# Patient Record
Sex: MALE | Race: WHITE | Employment: FULL TIME | URBAN - METROPOLITAN AREA
[De-identification: names, ages, dates, MRNs, and addresses within clinical notes are randomized per-mention and may not be internally consistent; named-entity substitution may affect disease eponyms.]

---

## 2019-04-08 ENCOUNTER — HOSPITAL ENCOUNTER (OUTPATIENT)
Age: 44
Discharge: HOME | End: 2019-04-08
Payer: COMMERCIAL

## 2019-04-08 LAB
A/G RATIO: 1.1 (ref 1.1–2.2)
ALBUMIN SERPL-MCNC: 4.2 G/DL (ref 3.4–5)
ALP BLD-CCNC: 78 U/L (ref 40–129)
ALT SERPL-CCNC: 17 U/L (ref 10–40)
ANION GAP SERPL CALCULATED.3IONS-SCNC: 14 MMOL/L (ref 3–16)
AST SERPL-CCNC: 16 U/L (ref 15–37)
BASOPHILS ABSOLUTE: 0 K/UL (ref 0–0.2)
BASOPHILS RELATIVE PERCENT: 0.9 %
BILIRUB SERPL-MCNC: <0.2 MG/DL (ref 0–1)
BUN BLDV-MCNC: 12 MG/DL (ref 7–20)
C-REACTIVE PROTEIN, HIGH SENSITIVITY: 1.84 MG/L (ref 0.16–3)
CALCIUM SERPL-MCNC: 9.1 MG/DL (ref 8.3–10.6)
CHLORIDE BLD-SCNC: 103 MMOL/L (ref 99–110)
CHOLESTEROL, TOTAL: 297 MG/DL (ref 0–199)
CO2: 22 MMOL/L (ref 21–32)
CREAT SERPL-MCNC: 0.8 MG/DL (ref 0.9–1.3)
EOSINOPHILS ABSOLUTE: 0.1 K/UL (ref 0–0.6)
EOSINOPHILS RELATIVE PERCENT: 1.8 %
FOLATE: 8.52 NG/ML (ref 4.78–24.2)
GFR AFRICAN AMERICAN: >60
GFR NON-AFRICAN AMERICAN: >60
GLOBULIN: 3.7 G/DL
GLUCOSE BLD-MCNC: 104 MG/DL (ref 70–99)
HCT VFR BLD CALC: 43.3 % (ref 40.5–52.5)
HDLC SERPL-MCNC: 32 MG/DL (ref 40–60)
HEMOGLOBIN: 14.7 G/DL (ref 13.5–17.5)
HOMOCYSTEINE: 11 UMOL/L (ref 0–10)
LDL CHOLESTEROL CALCULATED: 237 MG/DL
LYMPHOCYTES ABSOLUTE: 1.2 K/UL (ref 1–5.1)
LYMPHOCYTES RELATIVE PERCENT: 24.1 %
MCH RBC QN AUTO: 29 PG (ref 26–34)
MCHC RBC AUTO-ENTMCNC: 33.9 G/DL (ref 31–36)
MCV RBC AUTO: 85.7 FL (ref 80–100)
MONOCYTES ABSOLUTE: 0.3 K/UL (ref 0–1.3)
MONOCYTES RELATIVE PERCENT: 5.8 %
NEUTROPHILS ABSOLUTE: 3.3 K/UL (ref 1.7–7.7)
NEUTROPHILS RELATIVE PERCENT: 67.4 %
PDW BLD-RTO: 12.4 % (ref 12.4–15.4)
PLATELET # BLD: 253 K/UL (ref 135–450)
PMV BLD AUTO: 8.1 FL (ref 5–10.5)
POTASSIUM SERPL-SCNC: 4.4 MMOL/L (ref 3.5–5.1)
RBC # BLD: 5.06 M/UL (ref 4.2–5.9)
SEDIMENTATION RATE, ERYTHROCYTE: 17 MM/HR (ref 0–15)
SODIUM BLD-SCNC: 139 MMOL/L (ref 136–145)
T3 FREE: 3.2 PG/ML (ref 2.3–4.2)
T4 FREE: 1.1 NG/DL (ref 0.9–1.8)
TOTAL CK: 86 U/L (ref 39–308)
TOTAL PROTEIN: 7.9 G/DL (ref 6.4–8.2)
TRIGL SERPL-MCNC: 142 MG/DL (ref 0–150)
TSH SERPL DL<=0.05 MIU/L-ACNC: 1.5 UIU/ML (ref 0.27–4.2)
VITAMIN B-12: 562 PG/ML (ref 211–911)
VITAMIN D 25-HYDROXY: 12.3 NG/ML
VLDLC SERPL CALC-MCNC: 28 MG/DL
WBC # BLD: 4.9 K/UL (ref 4–11)

## 2019-04-08 PROCEDURE — 84207 ASSAY OF VITAMIN B-6: CPT

## 2019-04-08 PROCEDURE — 85730 THROMBOPLASTIN TIME PARTIAL: CPT

## 2019-04-08 PROCEDURE — 84443 ASSAY THYROID STIM HORMONE: CPT

## 2019-04-08 PROCEDURE — 82607 VITAMIN B-12: CPT

## 2019-04-08 PROCEDURE — 85652 RBC SED RATE AUTOMATED: CPT

## 2019-04-08 PROCEDURE — 82785 ASSAY OF IGE: CPT

## 2019-04-08 PROCEDURE — 86038 ANTINUCLEAR ANTIBODIES: CPT

## 2019-04-08 PROCEDURE — 86141 C-REACTIVE PROTEIN HS: CPT

## 2019-04-08 PROCEDURE — 80053 COMPREHEN METABOLIC PANEL: CPT

## 2019-04-08 PROCEDURE — 86147 CARDIOLIPIN ANTIBODY EA IG: CPT

## 2019-04-08 PROCEDURE — 84270 ASSAY OF SEX HORMONE GLOBUL: CPT

## 2019-04-08 PROCEDURE — 80061 LIPID PANEL: CPT

## 2019-04-08 PROCEDURE — 85303 CLOT INHIBIT PROT C ACTIVITY: CPT

## 2019-04-08 PROCEDURE — 82085 ASSAY OF ALDOLASE: CPT

## 2019-04-08 PROCEDURE — 84439 ASSAY OF FREE THYROXINE: CPT

## 2019-04-08 PROCEDURE — 82550 ASSAY OF CK (CPK): CPT

## 2019-04-08 PROCEDURE — 85306 CLOT INHIBIT PROT S FREE: CPT

## 2019-04-08 PROCEDURE — 82542 COL CHROMOTOGRAPHY QUAL/QUAN: CPT

## 2019-04-08 PROCEDURE — 36415 COLL VENOUS BLD VENIPUNCTURE: CPT

## 2019-04-08 PROCEDURE — 83090 ASSAY OF HOMOCYSTEINE: CPT

## 2019-04-08 PROCEDURE — 82746 ASSAY OF FOLIC ACID SERUM: CPT

## 2019-04-08 PROCEDURE — 84403 ASSAY OF TOTAL TESTOSTERONE: CPT

## 2019-04-08 PROCEDURE — 81240 F2 GENE: CPT

## 2019-04-08 PROCEDURE — 84481 FREE ASSAY (FT-3): CPT

## 2019-04-08 PROCEDURE — 85610 PROTHROMBIN TIME: CPT

## 2019-04-08 PROCEDURE — 85025 COMPLETE CBC W/AUTO DIFF WBC: CPT

## 2019-04-08 PROCEDURE — 82306 VITAMIN D 25 HYDROXY: CPT

## 2019-04-08 PROCEDURE — 85613 RUSSELL VIPER VENOM DILUTED: CPT

## 2019-04-09 LAB — ANTI-NUCLEAR ANTIBODY (ANA): NEGATIVE

## 2019-04-11 LAB
ALDOLASE: 4 U/L (ref 1.5–8.1)
DRVVT CONFIRMATION TEST: NORMAL RATIO
DRVVT SCREEN: 40 SEC (ref 33–44)
DRVVT,DIL: NORMAL SEC (ref 33–44)
HEXAGONAL PHOSPHOLIPID NEUTRALIZAT TEST: NORMAL
LUPUS ANTICOAG INTERP: NORMAL
PLT NEUTA: NORMAL
PROTEIN C FUNCTIONAL: 149 % (ref 83–168)
PROTEIN S, FUNCTIONAL: 115 % (ref 66–143)
PT D: 12.8 SEC (ref 12–15.5)
PTT D: 46 SEC (ref 32–48)
PTT-D CORR REFLEX: NORMAL SEC (ref 32–48)
PTT-HEPARIN NEUTRALIZED: NORMAL SEC (ref 32–48)
REPTILASE TIME: NORMAL SEC
THROMBIN TIME: NORMAL SEC (ref 14.7–19.5)

## 2019-04-12 LAB
ANTICARDIOLIPIN IGA ANTIBODY: 2 APL (ref 0–11)
ANTICARDIOLIPIN IGG ANTIBODY: 2 GPL (ref 0–14)
CARDIOLIPIN AB IGM: 0 MPL (ref 0–12)
IGE: 62 KU/L
PROTHROMBIN G20210A MUTATION: NEGATIVE
PT PCR SPECIMEN: NORMAL
SEX HORMONE BINDING GLOBULIN: 54 NMOL/L (ref 11–80)
TESTOSTERONE FREE-NONMALE: 87.2 PG/ML (ref 47–244)
TESTOSTERONE TOTAL: 566 NG/DL (ref 220–1000)

## 2019-04-13 LAB — VITAMIN B6: 42.5 NMOL/L (ref 20–125)

## 2019-04-15 LAB — MISCELLANEOUS LAB TEST ORDER: ABNORMAL

## 2020-12-07 ENCOUNTER — OFFICE VISIT (OUTPATIENT)
Dept: ENT CLINIC | Age: 45
End: 2020-12-07
Payer: COMMERCIAL

## 2020-12-07 VITALS
DIASTOLIC BLOOD PRESSURE: 87 MMHG | SYSTOLIC BLOOD PRESSURE: 155 MMHG | TEMPERATURE: 97.1 F | WEIGHT: 300 LBS | HEART RATE: 81 BPM

## 2020-12-07 PROBLEM — K14.8 TONGUE LESION: Status: ACTIVE | Noted: 2020-12-07

## 2020-12-07 PROCEDURE — 99203 OFFICE O/P NEW LOW 30 MIN: CPT | Performed by: OTOLARYNGOLOGY

## 2020-12-07 RX ORDER — DEXTROAMPHETAMINE SACCHARATE, AMPHETAMINE ASPARTATE, DEXTROAMPHETAMINE SULFATE AND AMPHETAMINE SULFATE 2.5; 2.5; 2.5; 2.5 MG/1; MG/1; MG/1; MG/1
TABLET ORAL
COMMUNITY
Start: 2020-11-25

## 2020-12-07 RX ORDER — ICOSAPENT ETHYL 1000 MG/1
CAPSULE ORAL
COMMUNITY
Start: 2020-09-27

## 2020-12-07 RX ORDER — BISOPROLOL FUMARATE AND HYDROCHLOROTHIAZIDE 5; 6.25 MG/1; MG/1
TABLET ORAL
COMMUNITY
Start: 2020-09-19

## 2020-12-07 RX ORDER — MULTIVITAMIN
1 TABLET ORAL DAILY
COMMUNITY

## 2020-12-07 SDOH — HEALTH STABILITY: MENTAL HEALTH: HOW OFTEN DO YOU HAVE A DRINK CONTAINING ALCOHOL?: NEVER

## 2020-12-07 NOTE — PROGRESS NOTES
[facility]       NEW PATIENT VISIT    PCP:  Ebony Hurt III, DO    REFERRED BY:   Ebony Hurt III, DO       CHIEF COMPLAINT:  Chief Complaint   Patient presents with    Mouth Lesions       HISTORY OF PRESENT ILLNESS:       Tu Mojica is a 39 y.o. male here for evaluation and treatment of a problem located in the mouth. Dr. Yadiel Palacios and dentist did not see anything. The quality is a weited feeling in the mouth feels like should be something there but  And dentist did not see anything . Located on the very back of side and underneath tongue not pain feels numb. If I move ytongue to the left feel a little prick of pain but not much. The severity is mild. More of a concern it worries me more than it hurts. The duration is 3 months. One morning just felt it, been there ever since somedays more intense than others. Worse it I play with it. The timing is constant. The context is sudden onset just appeared on morning. Modifying allev or aggravating factors include none. Associated symptoms include no pus no bleeding. REVIEW OF SYSTEMS:    CONSTITUTIONAL:  Denied fever. Denied unexplained weight loss, over 20 pounds in the past six months. EARS, NOSE, THROAT:  Denied otorrhea, otalgia, hearing loss, tinnitus, rhinorrhea, nasal dyspnea, sore throat and hoarseness. PAST MEDICAL HISTORY:    Past Medical History:   Diagnosis Date    Hyperlipidemia     Hypertension          No past surgical history on file. EXAMINATION:    Vitals:    12/07/20 1420   BP: (!) 155/87   Site: Right Upper Arm   Position: Sitting   Cuff Size: Large Adult   Pulse: 81   Temp: 97.1 °F (36.2 °C)   TempSrc: Temporal   Weight: 300 lb (136.1 kg)     VITALS SIGNS were reviewed. GENERAL APPEARANCE: WDWN NAD, Alert and oriented X 3. EYES:  Extraocular motion was intact, bilaterally. Normal primary gaze alignment. alternatives of therapy, expected outcome and potential benefits. I discussed the attendant risks and potential complications, including, but not limited to, excessive intraoperative or postoperative bleeding, hemorrhage, infection, vocal cord injury or paralysis, permanent hoarseness or change in or loss of voice, airway obstruction, need for intubation or tracheotomy, injury to surrounding structures or organs, injury to major blood vessels or nerves, excessive scarring, poor (incomplete or lack of) wound healing, pain, discomfort, numbness of lip/teeth/gums/tongue, need for further surgery or other therapy, and risks of anesthesia, including heart attack, cardiac arrest, stroke, blood clots in lower extremity veins, pulmonary embolism, and death, and other risks listed on the informed consent document, which we discussed together. All Jaun's questions were answered. Jaun then stated and confirmed understanding and acceptance of the preceding, lack of further questions and the desire to proceed with surgery. Then, Jaun read and signed the informed consent document. IMPRESSION / DIAGNOSES / ORDERS:       Connie Youssef was seen today for mouth lesions. Diagnoses and all orders for this visit:    Tongue lesion           RECOMMENDATIONS/PLAN:      Return for post op check. EUA and direct laryngoscopy and biopsy of the area of firmness at the posterior lateral ventral tongue area and any other abnormal tissue versus recheck in 6 weeks.   SDS  30 - 45 minutes, SDS

## 2020-12-08 ENCOUNTER — TELEPHONE (OUTPATIENT)
Dept: ENT CLINIC | Age: 45
End: 2020-12-08

## 2020-12-08 ENCOUNTER — OFFICE VISIT (OUTPATIENT)
Dept: PRIMARY CARE CLINIC | Age: 45
End: 2020-12-08
Payer: COMMERCIAL

## 2020-12-08 PROCEDURE — 99211 OFF/OP EST MAY X REQ PHY/QHP: CPT | Performed by: NURSE PRACTITIONER

## 2020-12-08 NOTE — PATIENT INSTRUCTIONS

## 2020-12-09 ENCOUNTER — TELEPHONE (OUTPATIENT)
Dept: ENT CLINIC | Age: 45
End: 2020-12-09

## 2020-12-09 LAB — SARS-COV-2: NOT DETECTED

## 2020-12-09 RX ORDER — EZETIMIBE 10 MG/1
10 TABLET ORAL DAILY
COMMUNITY

## 2020-12-09 NOTE — TELEPHONE ENCOUNTER
Call from [facility] stating pt was not able to get his Pre-op physical because he was put on the surgery schedule so soon but did have his COVID test done.    CB# PAT office @ [facility] [phone] Dorota Cameron

## 2020-12-09 NOTE — TELEPHONE ENCOUNTER
Would you be willing to do the patient's H&P since he did get the covid test, or do we need to reschedule the surgery

## 2020-12-09 NOTE — PROGRESS NOTES
Name_______________________________________Printed:____________________  Date and time of surgery_12/11/20  0730_____________________Arrival Time:_0600  [facility]_______________   1. The instructions given regarding when and if a patient needs to stop oral intake prior to surgery varies. Follow the specific instructions you were given                  _x__Nothing to eat or to drink after Midnight the night before.                             ____Endoscopy patient follow your DRS instructions-generally you will be doing a part of the prep after Midnight                   ____Carbo loading or ERAS instructions will be given to select patients-if you have been given those instructions -please do the following                           The evening before your surgery after dinner before midnight drink 40 ounces of gatorade. If you are diabetic use sugar free. The morning of surgery drink 40 ounces of water. This needs to be finished 3 hours prior to your surgery start time. 2. Take the following pills with a small sip of water on the morning of surgery___ziac________________________________________________                  Do not take blood pressure medications ending in pril or sartan the roque prior to surgery or the morning of surgery_   3. Aspirin, Ibuprofen, Advil, Naproxen, Vitamin E and other Anti-inflammatory products and supplements should be stopped for 5 -7days before surgery or as directed by your physician. 4. Check with your Doctor regarding stopping Plavix, Coumadin,Eliquis, Lovenox,Effient,Pradaxa,Xarelto, Fragmin or other blood thinners and follow their instructions. 5. Do not smoke, and do not drink any alcoholic beverages 24 hours prior to surgery. This includes NA Beer. Refrain from the usage of any recreational drugs. 6. You may brush your teeth and gargle the morning of surgery. DO NOT SWALLOW WATER   7.  You MUST make arrangements for a responsible adult to stay on site while you are here and take you home after your surgery. You will not be allowed to leave alone or drive yourself home. It is strongly suggested someone stay with you the first 24 hrs. Your surgery will be cancelled if you do not have a ride home. 8. A parent/legal guardian must accompany a child scheduled for surgery and plan to stay at the hospital until the child is discharged. Please do not bring other children with you. 9. Please wear simple, loose fitting clothing to the hospital.  Neha Dudley not bring valuables (money, credit cards, checkbooks, etc.) Do not wear any makeup (including no eye makeup) or nail polish on your fingers or toes. 10. DO NOT wear any jewelry or piercings on day of surgery. All body piercing jewelry must be removed. 11. If you have ___dentures, they will be removed before going to the OR; we will provide you a container. If you wear ___contact lenses or ___glasses, they will be removed; please bring a case for them. 12. Please see your family doctor/pediatrician for a history & physical and/or concerning medications. Bring any test results/reports from your physician's office. PCP__________________Phone___________H&P Appt. Date________             13 If you  have a Living Will and Durable Power of  for Healthcare, please bring in a copy. 15. Notify your Surgeon if you develop any illness between now and surgery  time, cough, cold, fever, sore throat, nausea, vomiting, etc.  Please notify your surgeon if you experience dizziness, shortness of breath or blurred vision between now & the time of your surgery             15. DO NOT shave your operative site 96 hours prior to surgery. For face & neck surgery, men may use an electric razor 48 hours prior to surgery. 16. Shower the night before or morning of surgery using an antibacterial soap or as you have been instructed.              17. To provide excellent care visitors will be limited to one in the room at any given time. 18.  Please bring picture ID and insurance card. 19.  Visit our web site for additional information:  [email or web address]              20.During flu season no children under the age of 15 are permitted in the hospital for the safety of all patients. 21. If you take a long acting insulin in the evening only  take half of your usual  dose the night  before your procedure              22. If you use a c-pap please bring DOS if staying overnight,             23.For your convenience [facility] has a pharmacy on site to fill your prescriptions. 24. If you use oxygen and have a portable tank please bring it  with you the DOS             25. Bring a complete list of all your medications with name and dose include any supplements. 26. Other__________________________________________   *Please call pre admission testing if you any further questions   [place] [phone]    [phone]  [phone]   [facility] [address]   [phone]  Patient instructed to get their COVID-19 test done as directed by their doctor (5-7 days prior to procedure)  or patient states will get on _12/8/20_________. Patient was notified that they need to have an appointment,number to call provided. The day the COVID test is done is considered day one. Instructed to self quarantine after test until DOS. There is a one visitor policy at [address] [place] [address] for all surgeries and endoscopies. Whether the visitor can stay or will be asked to wait in the car will depend on the current policy and if social distancing can be maintained. The policy is subject to change at any time. Please make sure the visitor has a cell phone that is on,charged and able to accept calls, as this may be the way that the staff communicates with them. Pain management is NO VISITOR policyThe patients ride is expected to remain in the car with a cell phone for communication. If the ride is leaving the hospital grounds please make sure they are back in time for pickup. Have the patient inform the staff on arrival what their rides plans are while the patient is in the facility. At the MAIN there is one visitor allowed. Please note that the visitor policy is subject to change. All above information reviewed with patient in person or by phone. Patient verbalizes understanding. All questions and concerns addressed.                                                                                                  Patient/Rep_patient___________________                                                                                                                                    PRE OP INSTRUCTIONS

## 2020-12-10 ENCOUNTER — HOSPITAL ENCOUNTER (OUTPATIENT)
Age: 45
Setting detail: OUTPATIENT SURGERY
Discharge: HOME | End: 2020-12-11
Attending: OTOLARYNGOLOGY | Admitting: OTOLARYNGOLOGY
Payer: COMMERCIAL

## 2020-12-10 ENCOUNTER — ANESTHESIA EVENT (OUTPATIENT)
Dept: OPERATING ROOM | Age: 45
End: 2020-12-10
Payer: COMMERCIAL

## 2020-12-11 ENCOUNTER — ANESTHESIA (OUTPATIENT)
Dept: OPERATING ROOM | Age: 45
End: 2020-12-11
Payer: COMMERCIAL

## 2020-12-11 VITALS
BODY MASS INDEX: 40.43 KG/M2 | DIASTOLIC BLOOD PRESSURE: 93 MMHG | SYSTOLIC BLOOD PRESSURE: 139 MMHG | TEMPERATURE: 97.4 F | HEIGHT: 74 IN | HEART RATE: 87 BPM | RESPIRATION RATE: 16 BRPM | OXYGEN SATURATION: 92 % | WEIGHT: 315 LBS

## 2020-12-11 VITALS
DIASTOLIC BLOOD PRESSURE: 115 MMHG | RESPIRATION RATE: 16 BRPM | OXYGEN SATURATION: 97 % | SYSTOLIC BLOOD PRESSURE: 165 MMHG

## 2020-12-11 LAB
ANION GAP SERPL CALCULATED.3IONS-SCNC: 10 MMOL/L (ref 3–16)
BUN BLDV-MCNC: 14 MG/DL (ref 7–20)
CALCIUM SERPL-MCNC: 8.9 MG/DL (ref 8.3–10.6)
CHLORIDE BLD-SCNC: 106 MMOL/L (ref 99–110)
CO2: 24 MMOL/L (ref 21–32)
CREAT SERPL-MCNC: 0.8 MG/DL (ref 0.9–1.3)
GFR AFRICAN AMERICAN: >60
GFR NON-AFRICAN AMERICAN: >60
GLUCOSE BLD-MCNC: 144 MG/DL (ref 70–99)
POTASSIUM SERPL-SCNC: 4.5 MMOL/L (ref 3.5–5.1)
SODIUM BLD-SCNC: 140 MMOL/L (ref 136–145)

## 2020-12-11 PROCEDURE — 31535 LARYNGOSCOPY W/BIOPSY: CPT | Performed by: OTOLARYNGOLOGY

## 2020-12-11 PROCEDURE — 88305 TISSUE EXAM BY PATHOLOGIST: CPT

## 2020-12-11 PROCEDURE — 6360000002 HC RX W HCPCS: Performed by: NURSE ANESTHETIST, CERTIFIED REGISTERED

## 2020-12-11 PROCEDURE — 3600000013 HC SURGERY LEVEL 3 ADDTL 15MIN: Performed by: OTOLARYNGOLOGY

## 2020-12-11 PROCEDURE — 7100000010 HC PHASE II RECOVERY - FIRST 15 MIN: Performed by: OTOLARYNGOLOGY

## 2020-12-11 PROCEDURE — 7100000001 HC PACU RECOVERY - ADDTL 15 MIN: Performed by: OTOLARYNGOLOGY

## 2020-12-11 PROCEDURE — 6360000002 HC RX W HCPCS: Performed by: OTOLARYNGOLOGY

## 2020-12-11 PROCEDURE — 2709999900 HC NON-CHARGEABLE SUPPLY: Performed by: OTOLARYNGOLOGY

## 2020-12-11 PROCEDURE — 80048 BASIC METABOLIC PNL TOTAL CA: CPT

## 2020-12-11 PROCEDURE — 3700000000 HC ANESTHESIA ATTENDED CARE: Performed by: OTOLARYNGOLOGY

## 2020-12-11 PROCEDURE — 7100000011 HC PHASE II RECOVERY - ADDTL 15 MIN: Performed by: OTOLARYNGOLOGY

## 2020-12-11 PROCEDURE — 2580000003 HC RX 258: Performed by: OTOLARYNGOLOGY

## 2020-12-11 PROCEDURE — 3600000003 HC SURGERY LEVEL 3 BASE: Performed by: OTOLARYNGOLOGY

## 2020-12-11 PROCEDURE — 7100000000 HC PACU RECOVERY - FIRST 15 MIN: Performed by: OTOLARYNGOLOGY

## 2020-12-11 PROCEDURE — 2500000003 HC RX 250 WO HCPCS: Performed by: NURSE ANESTHETIST, CERTIFIED REGISTERED

## 2020-12-11 PROCEDURE — 3700000001 HC ADD 15 MINUTES (ANESTHESIA): Performed by: OTOLARYNGOLOGY

## 2020-12-11 RX ORDER — ONDANSETRON 2 MG/ML
4 INJECTION INTRAMUSCULAR; INTRAVENOUS
Status: DISCONTINUED | OUTPATIENT
Start: 2020-12-11 | End: 2020-12-11 | Stop reason: HOSPADM

## 2020-12-11 RX ORDER — SODIUM CHLORIDE 0.9 % (FLUSH) 0.9 %
10 SYRINGE (ML) INJECTION EVERY 12 HOURS SCHEDULED
Status: DISCONTINUED | OUTPATIENT
Start: 2020-12-11 | End: 2020-12-11 | Stop reason: HOSPADM

## 2020-12-11 RX ORDER — SODIUM CHLORIDE, SODIUM LACTATE, POTASSIUM CHLORIDE, CALCIUM CHLORIDE 600; 310; 30; 20 MG/100ML; MG/100ML; MG/100ML; MG/100ML
INJECTION, SOLUTION INTRAVENOUS CONTINUOUS
Status: DISCONTINUED | OUTPATIENT
Start: 2020-12-11 | End: 2020-12-11 | Stop reason: HOSPADM

## 2020-12-11 RX ORDER — PROCHLORPERAZINE EDISYLATE 5 MG/ML
5 INJECTION INTRAMUSCULAR; INTRAVENOUS
Status: DISCONTINUED | OUTPATIENT
Start: 2020-12-11 | End: 2020-12-11 | Stop reason: HOSPADM

## 2020-12-11 RX ORDER — GLYCOPYRROLATE 0.2 MG/ML
INJECTION INTRAMUSCULAR; INTRAVENOUS PRN
Status: DISCONTINUED | OUTPATIENT
Start: 2020-12-11 | End: 2020-12-11 | Stop reason: SDUPTHER

## 2020-12-11 RX ORDER — SODIUM CHLORIDE 0.9 % (FLUSH) 0.9 %
10 SYRINGE (ML) INJECTION PRN
Status: DISCONTINUED | OUTPATIENT
Start: 2020-12-11 | End: 2020-12-11 | Stop reason: HOSPADM

## 2020-12-11 RX ORDER — MIDAZOLAM HYDROCHLORIDE 1 MG/ML
INJECTION INTRAMUSCULAR; INTRAVENOUS PRN
Status: DISCONTINUED | OUTPATIENT
Start: 2020-12-11 | End: 2020-12-11 | Stop reason: SDUPTHER

## 2020-12-11 RX ORDER — FENTANYL CITRATE 50 UG/ML
25 INJECTION, SOLUTION INTRAMUSCULAR; INTRAVENOUS EVERY 5 MIN PRN
Status: DISCONTINUED | OUTPATIENT
Start: 2020-12-11 | End: 2020-12-11 | Stop reason: HOSPADM

## 2020-12-11 RX ORDER — HYDROCODONE BITARTRATE AND ACETAMINOPHEN 5; 325 MG/1; MG/1
1 TABLET ORAL
Status: DISCONTINUED | OUTPATIENT
Start: 2020-12-11 | End: 2020-12-11 | Stop reason: HOSPADM

## 2020-12-11 RX ORDER — LIDOCAINE HYDROCHLORIDE 20 MG/ML
INJECTION, SOLUTION INFILTRATION; PERINEURAL PRN
Status: DISCONTINUED | OUTPATIENT
Start: 2020-12-11 | End: 2020-12-11 | Stop reason: SDUPTHER

## 2020-12-11 RX ORDER — LIDOCAINE HYDROCHLORIDE 10 MG/ML
1 INJECTION, SOLUTION EPIDURAL; INFILTRATION; INTRACAUDAL; PERINEURAL
Status: DISCONTINUED | OUTPATIENT
Start: 2020-12-11 | End: 2020-12-11 | Stop reason: HOSPADM

## 2020-12-11 RX ORDER — PROPOFOL 10 MG/ML
INJECTION, EMULSION INTRAVENOUS PRN
Status: DISCONTINUED | OUTPATIENT
Start: 2020-12-11 | End: 2020-12-11 | Stop reason: SDUPTHER

## 2020-12-11 RX ORDER — HYDROMORPHONE HCL 110MG/55ML
0.5 PATIENT CONTROLLED ANALGESIA SYRINGE INTRAVENOUS EVERY 5 MIN PRN
Status: DISCONTINUED | OUTPATIENT
Start: 2020-12-11 | End: 2020-12-11 | Stop reason: HOSPADM

## 2020-12-11 RX ORDER — HYDRALAZINE HYDROCHLORIDE 20 MG/ML
5 INJECTION INTRAMUSCULAR; INTRAVENOUS EVERY 10 MIN PRN
Status: DISCONTINUED | OUTPATIENT
Start: 2020-12-11 | End: 2020-12-11 | Stop reason: HOSPADM

## 2020-12-11 RX ORDER — LABETALOL HYDROCHLORIDE 5 MG/ML
5 INJECTION, SOLUTION INTRAVENOUS EVERY 10 MIN PRN
Status: DISCONTINUED | OUTPATIENT
Start: 2020-12-11 | End: 2020-12-11 | Stop reason: HOSPADM

## 2020-12-11 RX ORDER — FENTANYL CITRATE 50 UG/ML
INJECTION, SOLUTION INTRAMUSCULAR; INTRAVENOUS PRN
Status: DISCONTINUED | OUTPATIENT
Start: 2020-12-11 | End: 2020-12-11 | Stop reason: SDUPTHER

## 2020-12-11 RX ORDER — SUCCINYLCHOLINE/SOD CL,ISO/PF 100 MG/5ML
SYRINGE (ML) INTRAVENOUS PRN
Status: DISCONTINUED | OUTPATIENT
Start: 2020-12-11 | End: 2020-12-11 | Stop reason: SDUPTHER

## 2020-12-11 RX ADMIN — LIDOCAINE HYDROCHLORIDE 100 MG: 20 INJECTION, SOLUTION INFILTRATION; PERINEURAL at 07:34

## 2020-12-11 RX ADMIN — SODIUM CHLORIDE, POTASSIUM CHLORIDE, SODIUM LACTATE AND CALCIUM CHLORIDE: 600; 310; 30; 20 INJECTION, SOLUTION INTRAVENOUS at 07:30

## 2020-12-11 RX ADMIN — Medication 160 MG: at 07:35

## 2020-12-11 RX ADMIN — FENTANYL CITRATE 50 MCG: 50 INJECTION, SOLUTION INTRAMUSCULAR; INTRAVENOUS at 07:31

## 2020-12-11 RX ADMIN — PROPOFOL 350 MG: 10 INJECTION, EMULSION INTRAVENOUS at 07:34

## 2020-12-11 RX ADMIN — Medication 30 MG: at 07:48

## 2020-12-11 RX ADMIN — Medication 30 MG: at 07:52

## 2020-12-11 RX ADMIN — GLYCOPYRROLATE 0.4 MG: 0.2 INJECTION, SOLUTION INTRAMUSCULAR; INTRAVENOUS at 07:38

## 2020-12-11 RX ADMIN — Medication 3 G: at 07:29

## 2020-12-11 RX ADMIN — SODIUM CHLORIDE, POTASSIUM CHLORIDE, SODIUM LACTATE AND CALCIUM CHLORIDE: 600; 310; 30; 20 INJECTION, SOLUTION INTRAVENOUS at 07:00

## 2020-12-11 RX ADMIN — MIDAZOLAM 2 MG: 1 INJECTION INTRAMUSCULAR; INTRAVENOUS at 07:24

## 2020-12-11 ASSESSMENT — PULMONARY FUNCTION TESTS
PIF_VALUE: 23
PIF_VALUE: 21
PIF_VALUE: 1
PIF_VALUE: 23
PIF_VALUE: 3
PIF_VALUE: 1
PIF_VALUE: 21
PIF_VALUE: 18
PIF_VALUE: 22
PIF_VALUE: 3
PIF_VALUE: 2
PIF_VALUE: 21
PIF_VALUE: 1
PIF_VALUE: 17
PIF_VALUE: 21
PIF_VALUE: 3
PIF_VALUE: 22
PIF_VALUE: 25
PIF_VALUE: 19
PIF_VALUE: 22
PIF_VALUE: 21
PIF_VALUE: 33
PIF_VALUE: 6
PIF_VALUE: 24
PIF_VALUE: 25
PIF_VALUE: 19
PIF_VALUE: 19
PIF_VALUE: 12
PIF_VALUE: 3
PIF_VALUE: 1
PIF_VALUE: 23
PIF_VALUE: 20
PIF_VALUE: 24
PIF_VALUE: 21
PIF_VALUE: 25
PIF_VALUE: 24
PIF_VALUE: 3
PIF_VALUE: 22
PIF_VALUE: 1
PIF_VALUE: 1
PIF_VALUE: 30
PIF_VALUE: 22
PIF_VALUE: 23
PIF_VALUE: 21
PIF_VALUE: 24
PIF_VALUE: 5
PIF_VALUE: 24
PIF_VALUE: 22
PIF_VALUE: 1
PIF_VALUE: 34
PIF_VALUE: 26
PIF_VALUE: 20

## 2020-12-11 ASSESSMENT — PAIN - FUNCTIONAL ASSESSMENT: PAIN_FUNCTIONAL_ASSESSMENT: 0-10

## 2020-12-11 ASSESSMENT — PAIN SCALES - GENERAL: PAINLEVEL_OUTOF10: 1

## 2020-12-11 ASSESSMENT — ENCOUNTER SYMPTOMS: SHORTNESS OF BREATH: 0

## 2020-12-11 NOTE — H&P
Date of Surgery Update:  Ashutosh Toledo was seen, history and physical examination reviewed, and patient examined by me today. There have been no significant clinical changes since the completion of the previous history and physical.    The risk, benefits, and alternatives of the proposed procedure have been explained to the patient (or appropriate guardian) and understanding verbalized. All questions answered. Patient wishes to proceed.     Electronically signed by: Ted Forman MD,12/11/2020,7:22 AM

## 2020-12-11 NOTE — ANESTHESIA POSTPROCEDURE EVALUATION
Department of Anesthesiology  Postprocedure Note    Patient: Paul Strauss  MRN: [id number]  YOB: 1975  Date of evaluation: 12/11/2020  Time:  9:21 AM     Procedure Summary     Date:  12/11/20 Room / Location:  [address]    Anesthesia Start:  0730 Anesthesia Stop:  0830    Procedure:  EXAM UNDER ANESTHESIA, DIRECT LARYNGOSCOPY,  BIOPSIES OF RIGHT  POSTERIOR LATERAL TONGUE AND RIGHT  BASE OF TONGUE (N/A ) Diagnosis:  (K14.8  TONGUE LESION)    Surgeon:  Pablo Cash MD Responsible Provider:  Manoj Wu MD    Anesthesia Type:  general ASA Status:  3          Anesthesia Type: general    Crystal Phase I: Crystal Score: 8    Crystal Phase II: Crystal Score: 10    Last vitals: Reviewed and per EMR flowsheets.        Anesthesia Post Evaluation    Patient location during evaluation: PACU  Patient participation: complete - patient participated  Level of consciousness: awake and alert  Airway patency: patent  Nausea & Vomiting: no nausea and no vomiting  Complications: no  Cardiovascular status: hemodynamically stable  Respiratory status: acceptable  Hydration status: stable

## 2020-12-11 NOTE — ANESTHESIA PRE PROCEDURE
Department of Anesthesiology  Preprocedure Note       Name:  Li Domingo   Age:  39 y.o.  :  1975                                          MRN:  [id number]         Date:  2020      Surgeon: Michael Bearden):  Ashok Batista MD    Procedure: Procedure(s):  EXAM UNDER ANESTHESIA; BIOPSY OF POSTERIOR TONGUE  POSSIBLE DIRECT LARYNGOSCOPY WITH BIOPSIES    Medications prior to admission:   Prior to Admission medications    Medication Sig Start Date End Date Taking? Authorizing Provider   ezetimibe (ZETIA) 10 MG tablet Take 10 mg by mouth daily   Yes Historical Provider, MD   bisoprolol-hydroCHLOROthiazide (Ruby Sriram) 5-6.25 MG per tablet TAKE ONE TABLET BY MOUTH DAILY 20  Yes Historical Provider, MD   amphetamine-dextroamphetamine (ADDERALL) 10 MG tablet TAKE 1 TABLET TWICE A DAY 20  Yes Historical Provider, MD   Multiple Vitamin (MULTIVITAMIN) tablet Take 1 tablet by mouth daily   Yes Historical Provider, MD   VASCEPA 1 g CAPS capsule TAKE 2 CAPSULES BY MOUTH TWO TIMES DAILY 20  Yes Historical Provider, MD       Current medications:    Current Facility-Administered Medications   Medication Dose Route Frequency Provider Last Rate Last Dose    lactated ringers infusion   Intravenous Continuous Ashok Batista MD 50 mL/hr at 20 0700      ceFAZolin (ANCEF) 3 g in dextrose 5 % 100 mL IVPB  3 g Intravenous Once Ashok Batista MD           Allergies:  No Known Allergies    Problem List:    Patient Active Problem List   Diagnosis Code    Tongue lesion K14.8    Obesity E66.9       Past Medical History:        Diagnosis Date    Hyperlipidemia     Hypertension        Past Surgical History:  History reviewed. No pertinent surgical history.     Social History:    Social History     Tobacco Use    Smoking status: Former Smoker     Packs/day: 2.00     Years: 20.00     Pack years: 40.00     Last attempt to quit: 2017     Years since quitting: 3.9    Smokeless tobacco: Never Used   Substance Use Topics    Alcohol use: Yes     Alcohol/week: 3.0 standard drinks     Types: 3 Cans of beer per week     Frequency: Never     Comment: per month                                Counseling given: Not Answered      Vital Signs (Current):   Vitals:    12/09/20 1028 12/11/20 0648 12/11/20 0701   BP:   (!) 142/84   Pulse:   85   Resp:   16   Temp:   97.8 °F (36.6 °C)   TempSrc:   Temporal   SpO2:   95%   Weight: 300 lb (136.1 kg) (!) 335 lb (152 kg)    Height: 6' 2\" (1.88 m) 6' 2\" (1.88 m)                                               BP Readings from Last 3 Encounters:   12/11/20 (!) 142/84   12/07/20 (!) 155/87       NPO Status: Time of last liquid consumption: 2100                        Time of last solid consumption: 2100                        Date of last liquid consumption: 12/10/20                        Date of last solid food consumption: 12/10/20    BMI:   Wt Readings from Last 3 Encounters:   12/11/20 (!) 335 lb (152 kg)   12/07/20 300 lb (136.1 kg)     Body mass index is 43.01 kg/m². CBC:   Lab Results   Component Value Date    WBC 4.9 04/08/2019    RBC 5.06 04/08/2019    HGB 14.7 04/08/2019    HCT 43.3 04/08/2019    MCV 85.7 04/08/2019    RDW 12.4 04/08/2019     04/08/2019       CMP:   Lab Results   Component Value Date     04/08/2019    K 4.4 04/08/2019     04/08/2019    CO2 22 04/08/2019    BUN 12 04/08/2019    CREATININE 0.8 04/08/2019    GFRAA >60 04/08/2019    AGRATIO 1.1 04/08/2019    LABGLOM >60 04/08/2019    GLUCOSE 104 04/08/2019    PROT 7.9 04/08/2019    CALCIUM 9.1 04/08/2019    BILITOT <0.2 04/08/2019    ALKPHOS 78 04/08/2019    AST 16 04/08/2019    ALT 17 04/08/2019       POC Tests: No results for input(s): POCGLU, POCNA, POCK, POCCL, POCBUN, POCHEMO, POCHCT in the last 72 hours.     Coags: No results found for: PROTIME, INR, APTT    HCG (If Applicable): No results found for: PREGTESTUR, PREGSERUM, HCG, HCGQUANT     ABGs: No results found for: PHART, PO2ART, EYI6KEO, DLU3RKF, BEART, S0TWYONS     Type & Screen (If Applicable):  No results found for: LABABO, LABRH    Drug/Infectious Status (If Applicable):  No results found for: HIV, HEPCAB    COVID-19 Screening (If Applicable):   Lab Results   Component Value Date    COVID19 Not Detected 12/08/2020         Anesthesia Evaluation  Patient summary reviewed and Nursing notes reviewed no history of anesthetic complications:   Airway: Mallampati: II  TM distance: >3 FB   Neck ROM: full  Mouth opening: > = 3 FB Dental: normal exam         Pulmonary:       (-) asthma and shortness of breath                           Cardiovascular:    (+) hypertension:, hyperlipidemia    (-)  angina                Neuro/Psych:      (-) CVA           GI/Hepatic/Renal:   (+) morbid obesity     (-) GERD and liver disease       Endo/Other:        (-) diabetes mellitus, hypothyroidism               Abdominal:           Vascular:     - PVD. Anesthesia Plan      general     ASA 3       Induction: intravenous. MIPS: Postoperative opioids intended and Prophylactic antiemetics administered. Anesthetic plan and risks discussed with patient. Use of blood products discussed with patient whom. Plan discussed with CRNA.                 Jm Singleton MD   12/11/2020

## 2020-12-11 NOTE — BRIEF OP NOTE
Brief Postoperative Note      Patient: Yanick Acevedo  YOB: 1975  MRN: [id number]    Date of Procedure: 12/11/2020    Pre-Op Diagnosis: K14.8  TONGUE LESION    Post-Op Diagnosis: Same       Procedure(s):  EXAM UNDER ANESTHESIA; BIOPSY OF POSTERIOR LATERAL TONGUE,  DIRECT LARYNGOSCOPY WITH BIOPSIES    Surgeon(s):  Johnny Allison MD    Anesthesia: General    Estimated Blood Loss (mL): Minimal    Complications: None    Specimens:   ID Type Source Tests Collected by Time Destination   A : A. BIOPSY POSTERIOR LATERAL TONGUE Tissue Tissue SURGICAL PATHOLOGY Johnny Allison MD 12/11/2020 2926    B : B. BIOPSY  RIGHT BASE OF TONGUE Tissue Tissue SURGICAL PATHOLOGY Johnny Allison MD 12/11/2020 0800        Findings: thickened erythematous mildly nodular tissue at the right posterior lateral ventral tongue area and adjacent base of tongue. No other lesions in the oropharynx, hypopharynx, base of tongue, epiglottis/supraglottis, false vocal cords, true vocal cords, subglottis, post cricoid area, and piriform sinuses bilaterally.          Electronically signed by Johnny Allison MD on 12/11/2020 at 8:14 AM

## 2020-12-11 NOTE — H&P
ADDENDUM TO H&P note of 12/07/2020    History of present illness/reason for procedure:    Sensation of a lesion in the mouth at the right posterior lateral area of the tongue. Possible lesion visualized in office examination. Examination:   WDWN, NAD  Ears: TM and EAC normal  Nose: septum, tubinates, mucosa, and secretions normal.   Oral cavity/oropharynx:  Possible lesion at the right posterior lateral tongue. Chest symmmetric respiration. Lungs:  Clear to auscultation in all fields. Heart:  RR&R with no murmur  Abdomen:  Soft, nontender with normal bowel sounds. Extremities:  JONAS      Diagnosis/assessment   Lesion tongue R/O malignancy.       Treatment plan:  EUA, DL and biopsies

## 2020-12-11 NOTE — PROGRESS NOTES
Discharge instructions reviewed with patient/responsible adult pver phone with wife. All home medications have been reviewed, questions answered and patient verbalized understanding. Discharge instructions signed and copies given. Patient discharged home with belongings.

## 2020-12-19 NOTE — BRIEF OP NOTE
Brief Postoperative Note      Patient: Daina Cutting  YOB: 1975  MRN: [id number]    Date of Procedure: 12/11/2020    Pre-Op Diagnosis: K14.8  TONGUE LESION    Post-Op Diagnosis: Same       Procedure(s):  EXAM UNDER ANESTHESIA, DIRECT LARYNGOSCOPY,  BIOPSIES OF RIGHT  POSTERIOR LATERAL TONGUE AND RIGHT  BASE OF TONGUE    Surgeon(s):  Yanelis Strickland MD    Assistant:  * No surgical staff found *    Anesthesia: General    Estimated Blood Loss (mL): Minimal    Complications: None    Specimens:   ID Type Source Tests Collected by Time Destination   A : A. BIOPSY POSTERIOR LATERAL TONGUE Tissue Tissue SURGICAL PATHOLOGY Yanelis Strickland MD 12/11/2020 7861    B : B. BIOPSY  RIGHT BASE OF TONGUE Tissue Tissue SURGICAL PATHOLOGY Yanelis Strickland MD 12/11/2020 0800        Findings: Lesion on the right posterior lateral ventral tongue and adjacent base of tongue, biopsies taken. See op report for details.     Electronically signed by Yanelis Strickland MD on 12/18/2020 at 10:22 PM

## 2020-12-19 NOTE — OP NOTE
revealed no neck masses or lymphadenopathy. Patient was then awakened, extubated, and taken to the PACU in stable condition having tolerated the procedure well with no intraoperative or complications and minimal blood loss.     Electronically signed by Vi Cao MD on 12/18/2020 at 10:13 PM

## 2021-02-02 ENCOUNTER — OFFICE VISIT (OUTPATIENT)
Dept: ENT CLINIC | Age: 46
End: 2021-02-02
Payer: COMMERCIAL

## 2021-02-02 VITALS — HEART RATE: 75 BPM | SYSTOLIC BLOOD PRESSURE: 137 MMHG | DIASTOLIC BLOOD PRESSURE: 82 MMHG

## 2021-02-02 DIAGNOSIS — K14.8 TONGUE LESION: Primary | ICD-10-CM

## 2021-02-02 PROCEDURE — 99212 OFFICE O/P EST SF 10 MIN: CPT | Performed by: OTOLARYNGOLOGY

## 2021-02-02 NOTE — PROGRESS NOTES
Chief Complaint   Patient presents with    Follow-up     nothing has changed, has tested positive for COVID antibodies          HISTORY:     Symptoms remain the same, \"nothing has changed. \"  He stated that he tested positive for COVID-19 antibodies. Otherwise, he is doing well. EXAMINATION:        Vitals:    02/02/21 1539   BP: 137/82   Pulse: 75      WDWN, awake and alert, with no evidence of distress. No lesions seen in the right ventral mid tongue area and in the right posterior lateral tongue at the junction with the tonsillar pillar. PATHOLOGY:     A. Tongue, posterior-lateral, biopsy:        - Reactive squamous mucosa with lymphoid aggregates, suggestive for lingual tonsil.        - Negative for dysplasia or malignancy.        B. Tongue, right base, biopsy:        - Reactive squamous mucosa with edema and focal lymphoid aggregate.        - Negative for dysplasia or malignancy. IMPRESSION / Dotty Sinning / Delbra Ahr / PROCEDURES:   Johana Decker was seen today for follow-up. Diagnoses and all orders for this visit:    Tongue lesion  Comments:  needs continued observation/surveillance. RECOMMENDATIONS / PLAN:   1. Nancy was advised to follow the post-op instructions in the after visit summary given after surgery. 2. Jaun was advised to continue post-op medications as prescribed. Follow-up  Return in about 3 months (around 5/2/2021) for recheck/follow-up, and sooner if condition worsens.

## 2021-05-13 ENCOUNTER — HOSPITAL ENCOUNTER (OUTPATIENT)
Dept: CT IMAGING | Age: 46
Discharge: HOME | End: 2021-05-13
Payer: COMMERCIAL

## 2021-05-13 DIAGNOSIS — R06.00 DYSPNEA, UNSPECIFIED TYPE: ICD-10-CM

## 2021-05-13 DIAGNOSIS — K14.8 TONGUE MASS: ICD-10-CM

## 2021-05-13 DIAGNOSIS — R05.9 COUGH: ICD-10-CM

## 2021-05-13 DIAGNOSIS — R49.0 DYSPHONIA: ICD-10-CM

## 2021-05-13 LAB
A/G RATIO: 1.6 (ref 1.1–2.2)
ALBUMIN SERPL-MCNC: 4.7 G/DL (ref 3.4–5)
ALP BLD-CCNC: 76 U/L (ref 40–129)
ALT SERPL-CCNC: 20 U/L (ref 10–40)
ANION GAP SERPL CALCULATED.3IONS-SCNC: 7 MMOL/L (ref 3–16)
AST SERPL-CCNC: 20 U/L (ref 15–37)
BILIRUB SERPL-MCNC: 0.6 MG/DL (ref 0–1)
BUN BLDV-MCNC: 11 MG/DL (ref 7–20)
CALCIUM SERPL-MCNC: 9.3 MG/DL (ref 8.3–10.6)
CHLORIDE BLD-SCNC: 103 MMOL/L (ref 99–110)
CO2: 27 MMOL/L (ref 21–32)
CREAT SERPL-MCNC: 1 MG/DL (ref 0.9–1.3)
GFR AFRICAN AMERICAN: >60
GFR NON-AFRICAN AMERICAN: >60
GLOBULIN: 3 G/DL
GLUCOSE BLD-MCNC: 104 MG/DL (ref 70–99)
POTASSIUM SERPL-SCNC: 4.5 MMOL/L (ref 3.5–5.1)
SODIUM BLD-SCNC: 137 MMOL/L (ref 136–145)
TOTAL PROTEIN: 7.7 G/DL (ref 6.4–8.2)

## 2021-05-13 PROCEDURE — 6360000004 HC RX CONTRAST MEDICATION: Performed by: INTERNAL MEDICINE

## 2021-05-13 PROCEDURE — 80053 COMPREHEN METABOLIC PANEL: CPT

## 2021-05-13 PROCEDURE — 71260 CT THORAX DX C+: CPT

## 2021-05-13 PROCEDURE — 70491 CT SOFT TISSUE NECK W/DYE: CPT

## 2021-05-13 RX ADMIN — IOPAMIDOL 75 ML: 755 INJECTION, SOLUTION INTRAVENOUS at 12:44

## 2021-05-24 ENCOUNTER — OFFICE VISIT (OUTPATIENT)
Dept: ENT CLINIC | Age: 46
End: 2021-05-24
Payer: COMMERCIAL

## 2021-05-24 VITALS — DIASTOLIC BLOOD PRESSURE: 76 MMHG | SYSTOLIC BLOOD PRESSURE: 124 MMHG | HEART RATE: 75 BPM | TEMPERATURE: 97.7 F

## 2021-05-24 DIAGNOSIS — K14.8 TONGUE LESION: Primary | Chronic | ICD-10-CM

## 2021-05-24 PROCEDURE — 99213 OFFICE O/P EST LOW 20 MIN: CPT | Performed by: OTOLARYNGOLOGY

## 2021-05-24 ASSESSMENT — ENCOUNTER SYMPTOMS
RHINORRHEA: 0
VOICE CHANGE: 0
SINUS PAIN: 0
TROUBLE SWALLOWING: 0
SORE THROAT: 0

## 2021-09-01 ENCOUNTER — OFFICE VISIT (OUTPATIENT)
Dept: ENT CLINIC | Age: 46
End: 2021-09-01
Payer: COMMERCIAL

## 2021-09-01 VITALS — SYSTOLIC BLOOD PRESSURE: 144 MMHG | HEART RATE: 76 BPM | TEMPERATURE: 97.1 F | DIASTOLIC BLOOD PRESSURE: 78 MMHG

## 2021-09-01 DIAGNOSIS — K14.8 TONGUE LESION: Primary | Chronic | ICD-10-CM

## 2021-09-01 PROCEDURE — 99213 OFFICE O/P EST LOW 20 MIN: CPT | Performed by: OTOLARYNGOLOGY

## 2021-09-01 ASSESSMENT — ENCOUNTER SYMPTOMS
SORE THROAT: 0
RHINORRHEA: 0
SINUS PAIN: 0

## 2021-09-01 NOTE — PROGRESS NOTES
[facility]       PCP:  Alysa Zhu III, DO        CHIEF COMPLAINT  Chief Complaint   Patient presents with    Mouth Lesions     spot on tongue seems to still be present. HISTORY OF PRESENT ILLNESS       Jaun Bennett is a 42 y.o. male here for recheck and follow up of tongue lesion. Still there with no change, no worse, and no better. Has occasional throat clearing but uses tums and seems to clear up throat clearing problem. REVIEW OF SYSTEMS   Review of Systems   Constitutional: Negative for chills and fever. HENT: Negative for congestion, ear discharge, ear pain, hearing loss, mouth sores, nosebleeds, rhinorrhea, sinus pain, sore throat and tinnitus. PAST MEDICAL HISTORY    Past Medical History:   Diagnosis Date    Hyperlipidemia     Hypertension          Past Surgical History:   Procedure Laterality Date    MOUTH SURGERY N/A 12/11/2020    EXAM UNDER ANESTHESIA, DIRECT LARYNGOSCOPY,  BIOPSIES OF RIGHT  POSTERIOR LATERAL TONGUE AND RIGHT  BASE OF TONGUE performed by Kaia Peter MD at [address] [place] [address] Street:    09/01/21 1444   BP: (!) 144/78   Site: Right Upper Arm   Position: Sitting   Cuff Size: Large Adult   Pulse: 76   Temp: 97.1 °F (36.2 °C)   TempSrc: Temporal         Physical Exam  Vitals reviewed. Constitutional:       General: He is awake. He is not in acute distress. Appearance: Normal appearance. He is well-developed. He is not ill-appearing or toxic-appearing. HENT:      Head: Normocephalic and atraumatic. Salivary Glands: Right salivary gland is not diffusely enlarged or tender. Left salivary gland is not diffusely enlarged or tender.       Right Ear: Tympanic membrane, ear canal and external ear normal.      Left Ear: Tympanic membrane, ear canal and external ear normal.      Nose: Nose normal. No nasal deformity, septal deviation, mucosal edema, congestion or rhinorrhea. Right Turbinates: Not enlarged. Left Turbinates: Not enlarged. Right Sinus: No maxillary sinus tenderness or frontal sinus tenderness. Left Sinus: No maxillary sinus tenderness or frontal sinus tenderness. Mouth/Throat:      Lips: Pink. No lesions. Mouth: Mucous membranes are moist. No oral lesions. Tongue: No lesions. Palate: No mass and lesions. Pharynx: Oropharynx is clear. Uvula midline. No oropharyngeal exudate or posterior oropharyngeal erythema. Tonsils: No tonsillar exudate or tonsillar abscesses. Comments: In depth visualization and palpation of the area of the tongue designated by the patient revealed no specific lesion, mass or ulceration. Neck:      Thyroid: No thyroid mass, thyromegaly or thyroid tenderness. Trachea: No tracheal deviation. Musculoskeletal:      Cervical back: Normal range of motion and neck supple. Lymphadenopathy:      Cervical: No cervical adenopathy. Neurological:      Mental Status: He is alert. VERONICA / Azul Espino / Sp Morrison was seen today for mouth lesions. Diagnoses and all orders for this visit:    Tongue lesion         RECOMMENDATIONS/PLAN      1. Continue surveillance of tongue sensation/lesion and consider repeat biopsy. 2. Acetaminophen (eg. Tylenol) or Ibuprofen (eg. Advil) (over the counter medications) as needed for pain. 3. Return in about 3 months (around 12/1/2021) for recheck/follow-up, and sooner if condition worsens.

## 2021-12-01 ENCOUNTER — OFFICE VISIT (OUTPATIENT)
Dept: ENT CLINIC | Age: 46
End: 2021-12-01
Payer: COMMERCIAL

## 2021-12-01 VITALS — SYSTOLIC BLOOD PRESSURE: 144 MMHG | TEMPERATURE: 97.5 F | DIASTOLIC BLOOD PRESSURE: 85 MMHG | HEART RATE: 71 BPM

## 2021-12-01 DIAGNOSIS — K14.8 TONGUE LESION: Primary | Chronic | ICD-10-CM

## 2021-12-01 PROCEDURE — 99213 OFFICE O/P EST LOW 20 MIN: CPT | Performed by: OTOLARYNGOLOGY

## 2021-12-01 ASSESSMENT — ENCOUNTER SYMPTOMS
TROUBLE SWALLOWING: 0
SINUS PAIN: 0
RHINORRHEA: 0
SORE THROAT: 0
VOICE CHANGE: 0

## 2021-12-01 NOTE — PROGRESS NOTES
[facility]       PCP:  Danni Andujar DO      CHIEF COMPLAINT  Chief Complaint   Patient presents with    Mouth Lesions     tongue       HISTORY OF PRESENT ILLNESS       Jaun Ferrer is a 42 y.o. male here for recheck and follow up of tongue problem. \"somedays I don't notice it until I move my tongue and try to feel it but I've never had a day that I don't feel it. \"        REVIEW OF SYSTEMS   Review of Systems   Constitutional: Negative for chills, fever and unexpected weight change. HENT: Negative for ear discharge, ear pain, hearing loss, rhinorrhea, sinus pain, sore throat, tinnitus, trouble swallowing and voice change. PAST MEDICAL HISTORY    Past Medical History:   Diagnosis Date    Hyperlipidemia     Hypertension          Past Surgical History:   Procedure Laterality Date    MOUTH SURGERY N/A 12/11/2020    EXAM UNDER ANESTHESIA, DIRECT LARYNGOSCOPY,  BIOPSIES OF RIGHT  POSTERIOR LATERAL TONGUE AND RIGHT  BASE OF TONGUE performed by Casandra Corey MD at . [facility]:    12/01/21 1439   BP: (!) 144/85   Site: Left Upper Arm   Position: Sitting   Pulse: 71   Temp: 97.5 °F (36.4 °C)       Physical Exam  ENT: Tongue clear to inspection and palpation. NSD to left. therwise, clear. Neck no masses tenderness or lymphadenopathy. IMPRESSION / Dmitry Flowers / Phuong Johnson was seen today for mouth lesions. Diagnoses and all orders for this visit:    Tongue lesion  Comments:  no lesion on continued observation, negative by prior biopsy, sensation with no mucosal lesion        RECOMMENDATIONS/PLAN      1. Continue periodic recheck. 2. Acetaminophen (eg. Tylenol) or Ibuprofen (eg. Advil) (over the counter medications) as needed for pain. 3. Return in about 3 months (around 3/1/2022) for recheck/follow-up, and sooner if condition worsens.

## 2021-12-08 ENCOUNTER — HOSPITAL ENCOUNTER (OUTPATIENT)
Dept: VASCULAR LAB | Age: 46
Discharge: HOME | End: 2021-12-08
Payer: COMMERCIAL

## 2021-12-08 ENCOUNTER — HOSPITAL ENCOUNTER (OUTPATIENT)
Dept: CT IMAGING | Age: 46
Discharge: HOME | End: 2021-12-08
Payer: COMMERCIAL

## 2021-12-08 DIAGNOSIS — M79.89 SWELLING OF LIMB: ICD-10-CM

## 2021-12-08 DIAGNOSIS — R06.89 DYSPNEA AND RESPIRATORY ABNORMALITY: ICD-10-CM

## 2021-12-08 DIAGNOSIS — R06.00 DYSPNEA AND RESPIRATORY ABNORMALITY: ICD-10-CM

## 2021-12-08 LAB
A/G RATIO: 1.5 (ref 1.1–2.2)
ALBUMIN SERPL-MCNC: 4.8 G/DL (ref 3.4–5)
ALP BLD-CCNC: 92 U/L (ref 40–129)
ALT SERPL-CCNC: 16 U/L (ref 10–40)
ANION GAP SERPL CALCULATED.3IONS-SCNC: 12 MMOL/L (ref 3–16)
APTT: 52.1 SEC (ref 26.2–38.6)
AST SERPL-CCNC: 18 U/L (ref 15–37)
BILIRUB SERPL-MCNC: 0.3 MG/DL (ref 0–1)
BUN BLDV-MCNC: 9 MG/DL (ref 7–20)
CALCIUM SERPL-MCNC: 9.7 MG/DL (ref 8.3–10.6)
CHLORIDE BLD-SCNC: 102 MMOL/L (ref 99–110)
CO2: 24 MMOL/L (ref 21–32)
CREAT SERPL-MCNC: 0.8 MG/DL (ref 0.9–1.3)
D DIMER: 1006 NG/ML DDU (ref 0–229)
GFR AFRICAN AMERICAN: >60
GFR NON-AFRICAN AMERICAN: >60
GLUCOSE BLD-MCNC: 124 MG/DL (ref 70–99)
INR BLD: 1.31 (ref 0.88–1.12)
POTASSIUM SERPL-SCNC: 4.3 MMOL/L (ref 3.5–5.1)
PROTHROMBIN TIME: 15 SEC (ref 9.9–12.7)
SODIUM BLD-SCNC: 138 MMOL/L (ref 136–145)
TOTAL PROTEIN: 7.9 G/DL (ref 6.4–8.2)

## 2021-12-08 PROCEDURE — 71260 CT THORAX DX C+: CPT

## 2021-12-08 PROCEDURE — 6360000004 HC RX CONTRAST MEDICATION: Performed by: INTERNAL MEDICINE

## 2021-12-08 PROCEDURE — 93971 EXTREMITY STUDY: CPT

## 2021-12-08 RX ADMIN — IOPAMIDOL 80 ML: 755 INJECTION, SOLUTION INTRAVENOUS at 10:45

## 2021-12-16 ENCOUNTER — HOSPITAL ENCOUNTER (OUTPATIENT)
Dept: GENERAL RADIOLOGY | Age: 46
Discharge: HOME | End: 2021-12-16
Payer: COMMERCIAL

## 2021-12-16 ENCOUNTER — HOSPITAL ENCOUNTER (OUTPATIENT)
Age: 46
Discharge: HOME | End: 2021-12-16
Payer: COMMERCIAL

## 2021-12-16 DIAGNOSIS — M25.552 LEFT HIP PAIN: ICD-10-CM

## 2021-12-16 PROCEDURE — 72100 X-RAY EXAM L-S SPINE 2/3 VWS: CPT

## 2021-12-16 PROCEDURE — 73502 X-RAY EXAM HIP UNI 2-3 VIEWS: CPT

## 2022-04-14 ENCOUNTER — OFFICE VISIT (OUTPATIENT)
Dept: ENT CLINIC | Age: 47
End: 2022-04-14
Payer: COMMERCIAL

## 2022-04-14 VITALS
DIASTOLIC BLOOD PRESSURE: 74 MMHG | TEMPERATURE: 97.3 F | SYSTOLIC BLOOD PRESSURE: 152 MMHG | HEART RATE: 68 BPM | OXYGEN SATURATION: 94 %

## 2022-04-14 DIAGNOSIS — K14.8 TONGUE LESION: Primary | ICD-10-CM

## 2022-04-14 PROCEDURE — 99213 OFFICE O/P EST LOW 20 MIN: CPT | Performed by: OTOLARYNGOLOGY

## 2022-04-14 RX ORDER — RIVAROXABAN 20 MG/1
TABLET, FILM COATED ORAL
COMMUNITY
Start: 2022-03-24

## 2022-04-14 NOTE — PROGRESS NOTES
[facility]       PCP:  Benson Puentes DO      CHIEF COMPLAINT  Chief Complaint   Patient presents with    Mouth Lesions     Tongue lesion       HISTORY OF PRESENT ILLNESS       Jaun Martinez is a 42 y.o. male here for recheck and follow up of tongue lesion. Patient reported that he developed a blood clot in December in his left leg, after he drove to [place] for Thanksgiving. He stated that the sensation on the tongue is still there, \"no better and no worse. \"  He stated that he no longer feels the sharp pain, little prick, when he moves his tongue, \"it's gone for a long time. \"      PAST MEDICAL HISTORY    Past Medical History:   Diagnosis Date    Hyperlipidemia     Hypertension        Past Surgical History:   Procedure Laterality Date    MOUTH SURGERY N/A 12/11/2020    EXAM UNDER ANESTHESIA, DIRECT LARYNGOSCOPY,  BIOPSIES OF RIGHT  POSTERIOR LATERAL TONGUE AND RIGHT  BASE OF TONGUE performed by Misty Teran MD at . [facility]:    04/14/22 1546   BP: (!) 152/74   Site: Left Upper Arm   Position: Sitting   Cuff Size: Large Adult   Pulse: 68   Temp: 97.3 °F (36.3 °C)   TempSrc: Temporal   SpO2: 94%       General:  WDWN, NAD, alert and oriented  Face: There was no swelling or lesions detected. Voice: Normal with no hoarseness or hot potato voice. Ears:  TMs and EACs appeared to be normal.        Nose: The nasal septum, turbinates, secretions, and mucosa appeared to be normal.   Sinuses:  Maxillary and frontal sinuses were nontender to palpation and percussion. Oral cavity:  No lesion seen or palpated at the right posterior lateral tongue area.   Mucosa, secretions, tongue, and gingiva appeared to be normal.    Oropharynx:  The palatine tonsils, hard and soft palates, uvula, tongue, posterior oropharyngeal wall, mucosa and secretions appeared to be normal.     Salivary Glands:  Normal bilateral parotid and bilateral submandibular salivary glands. Neck:  No masses or tenderness. Trachea midline. Laryngeal cartilages and hyoid bone normal.    Thyroid:  Normal, nontender, no goiter or nodules palpable. Lymph nodes: No cervical lymphadenopathy. VERONICA / Edwin Evans / Komal Dinero was seen today for mouth lesions. Diagnoses and all orders for this visit:    Tongue lesion         RECOMMENDATIONS/PLAN      Return in about 3 months (around 7/14/2022) for recheck/follow-up, and sooner if condition worsens.

## 2022-05-26 ENCOUNTER — HOSPITAL ENCOUNTER (OUTPATIENT)
Dept: VASCULAR LAB | Age: 47
Discharge: HOME | End: 2022-05-26
Payer: COMMERCIAL

## 2022-05-26 DIAGNOSIS — I82.402 DEEP VEIN THROMBOSIS (DVT) OF LEFT LOWER EXTREMITY, UNSPECIFIED CHRONICITY, UNSPECIFIED VEIN (HCC): ICD-10-CM

## 2022-05-26 PROCEDURE — 93971 EXTREMITY STUDY: CPT

## 2022-08-01 ENCOUNTER — OFFICE VISIT (OUTPATIENT)
Dept: ENT CLINIC | Age: 47
End: 2022-08-01
Payer: COMMERCIAL

## 2022-08-01 VITALS — TEMPERATURE: 97.7 F | HEART RATE: 61 BPM | SYSTOLIC BLOOD PRESSURE: 124 MMHG | DIASTOLIC BLOOD PRESSURE: 78 MMHG

## 2022-08-01 DIAGNOSIS — K14.8 LESION OF TONGUE: Primary | Chronic | ICD-10-CM

## 2022-08-01 PROCEDURE — 99213 OFFICE O/P EST LOW 20 MIN: CPT | Performed by: OTOLARYNGOLOGY

## 2022-08-01 RX ORDER — ROSUVASTATIN CALCIUM 5 MG/1
TABLET, COATED ORAL
COMMUNITY
Start: 2022-05-26

## 2022-08-01 NOTE — PROGRESS NOTES
[facility]       PCP:  Isidra Cristobal DO      CHIEF COMPLAINT  Chief Complaint   Patient presents with    Mouth Lesions     tongue lesion       HISTORY OF PRESENT ILLNESS       Jaun Martin is a 41 y.o. male here for recheck and follow up of tongue lesion. He stated that on days when his allergies are flaring, the tongue lesion seems to be more prominent. \"I notice it more, but it doesn't hurt and it never has hurt. \"        PAST MEDICAL HISTORY    Past Medical History:   Diagnosis Date    Hyperlipidemia     Hypertension        Past Surgical History:   Procedure Laterality Date    MOUTH SURGERY N/A 12/11/2020    EXAM UNDER ANESTHESIA, DIRECT LARYNGOSCOPY,  BIOPSIES OF RIGHT  POSTERIOR LATERAL TONGUE AND RIGHT  BASE [facility] TONGUE [facility]:    08/01/22 1648   BP: 124/78   Site: Right Upper Arm   Position: Sitting   Cuff Size: Large Adult   Pulse: 61   Temp: 97.7 °F (36.5 °C)   TempSrc: Temporal     General:  WDWN, NAD, alert and oriented  Face: There was no swelling or lesions detected. Voice: Normal with no hoarseness or hot potato voice. Oral cavity:  No lesion seen at the right lateral posterior tongue. Mucosa, secretions, tongue, and gingiva appeared to be normal.   Oropharynx:  The palatine tonsils, hard and soft palates, uvula, tongue, posterior oropharyngeal wall, mucosa and secretions appeared to be normal.     Salivary Glands:  Normal bilateral parotid and bilateral submandibular salivary glands. Neck:  No masses or tenderness. Trachea midline. Laryngeal cartilages and hyoid bone normal.  Normal laryngeal creptus. Thyroid:  Normal, nontender, no goiter or nodules palpable. Lymph nodes:  No cervical lymphadenopathy. IMPRESSION / Samantha Tyler / James Soriano was seen today for mouth lesions.     Diagnoses and all orders for this visit:    Lesion of tongue  -     CT SOFT TISSUE NECK W CONTRAST       RECOMMENDATIONS/PLAN      Direct laryngoscopy, EUA and biopsy of irregular tissue at right gingivopillar sulcus and lateral base of tongue and any other abnormalities. The altenative, which the patient selected,  is continued follow up with examinations and observation and an update CT scan of the neck. Return in about 1 month (around 9/1/2022) for recheck, follow-up CT scan, and sooner if condition worsens.

## 2022-08-18 ENCOUNTER — HOSPITAL ENCOUNTER (OUTPATIENT)
Dept: CT IMAGING | Age: 47
Discharge: HOME | End: 2022-08-18
Payer: COMMERCIAL

## 2022-08-18 DIAGNOSIS — K14.8 LESION OF TONGUE: ICD-10-CM

## 2022-08-18 LAB
CREAT SERPL-MCNC: 1.1 MG/DL (ref 0.9–1.3)
GFR AFRICAN AMERICAN: >60
GFR NON-AFRICAN AMERICAN: >60

## 2022-08-18 PROCEDURE — 82565 ASSAY OF CREATININE: CPT

## 2022-08-18 PROCEDURE — 6360000004 HC RX CONTRAST MEDICATION: Performed by: OTOLARYNGOLOGY

## 2022-08-18 PROCEDURE — 70491 CT SOFT TISSUE NECK W/DYE: CPT

## 2022-08-18 PROCEDURE — 36415 COLL VENOUS BLD VENIPUNCTURE: CPT

## 2022-08-18 RX ADMIN — IOPAMIDOL 75 ML: 755 INJECTION, SOLUTION INTRAVENOUS at 07:49

## 2022-09-01 ENCOUNTER — OFFICE VISIT (OUTPATIENT)
Dept: ENT CLINIC | Age: 47
End: 2022-09-01
Payer: COMMERCIAL

## 2022-09-01 VITALS — DIASTOLIC BLOOD PRESSURE: 85 MMHG | SYSTOLIC BLOOD PRESSURE: 137 MMHG | TEMPERATURE: 97.7 F | HEART RATE: 60 BPM

## 2022-09-01 DIAGNOSIS — K14.8 LESION OF TONGUE: Primary | Chronic | ICD-10-CM

## 2022-09-01 PROCEDURE — 99213 OFFICE O/P EST LOW 20 MIN: CPT | Performed by: OTOLARYNGOLOGY

## 2022-09-01 NOTE — PROGRESS NOTES
[facility]       PCP:  Walter Bustamante DO      CHIEF COMPLAINT  Chief Complaint   Patient presents with    Mouth Lesions     tongue       HISTORY OF PRESENT ILLNESS       Jaun Muniz is a 41 y.o. male here for recheck and follow up of lesion at right posterio-lateral tongue. He stated that sensation is still there and has not changed. EXAMINATION    Vitals:    09/01/22 0850   BP: 137/85   Site: Left Upper Arm   Position: Sitting   Cuff Size: Large Adult   Pulse: 60   Temp: 97.7 °F (36.5 °C)     General:  WDWN, NAD, alert and oriented  Face: There was no swelling or lesions detected. Voice: Normal with no hoarseness or hot potato voice. Oral cavity:  No lesion seen at right posterolateral tongue. Mucosa, secretions, tongue, and gingiva appeared to be normal.   Oropharynx:  The palatine tonsils, hard and soft palates, uvula, tongue, posterior oropharyngeal wall, mucosa and secretions appeared to be normal.           REVIEW OF IMAGES         I independently interpreted the images of the CT scan of the neck, showing no apparent lesion in the area of concern in the right posterolateral tongue. Narrative   EXAMINATION:   CT OF THE NECK SOFT TISSUE WITH CONTRAST  8/18/2022       TECHNIQUE:   CT of the neck was performed with the administration of intravenous contrast.   Multiplanar reformatted images are provided for review. Automated exposure   control, iterative reconstruction, and/or weight based adjustment of the   mA/kV was utilized to reduce the radiation dose to as low as reasonably   achievable. COMPARISON:   CT neck 05/13/2021       HISTORY:   ORDERING SYSTEM PROVIDED HISTORY: Lesion of tongue       FINDINGS:   PHARYNX/LARYNX:  The palatine tonsils again contain tiny calcifications   suggesting sequelae of prior tonsillitis.   The tongue is grossly unremarkable   in appearance within constraints of study related to streak artifact from the   dental amalgam.  The valleculae, epiglottis, aryepiglottic folds and pyriform   sinuses appear unremarkable. The true and false vocal cords are normal in   appearance. No mass or abscess is seen. SALIVARY GLANDS/THYROID:  The parotid and submandibular glands appear   unremarkable. The thyroid gland appears unremarkable. LYMPH NODES:  No cervical or supraclavicular lymphadenopathy is seen. No new   or progressive cervical lymphadenopathy. SOFT TISSUES:  No appreciable soft tissue swelling or mass is seen. BRAIN/ORBITS/SINUSES:  The visualized portion of the intracranial contents   appear unremarkable. The visualized portion of the orbits, paranasal sinuses   and mastoid air cells demonstrate no acute abnormality. Mild mucosal   thickening along floor of the maxillary sinuses. LUNG APICES/SUPERIOR MEDIASTINUM:  No focal consolidation is seen within the   visualized lung apices. No superior mediastinal lymphadenopathy or mass. The visualized portion of the trachea appears unremarkable. BONES:  No aggressive appearing lytic or blastic bony lesion. Impression   The tongue is grossly unremarkable in appearance within constraints of study   related to streak artifact from the dental amalgam. No discrete confluent   soft tissue mass within constraints of study. Direct visual inspection   recommended. No new or progressive cervical lymphadenopathy. Stable CT appearance of the neck compared to study from 05/13/2021. IMPRESSION / Alton Islas / Samina Hyatt was seen today for mouth lesions. Diagnoses and all orders for this visit:    Lesion of tongue  Comments:  no evidence of disease on exam and CT, symptoms of sensation of a corn kernel stuck there. RECOMMENDATIONS/PLAN      Patient considered alternative of DL and biopsy and chose to continue observation.     Return in about 6 months (around 3/1/2023) for recheck/follow-up, and sooner if condition worsens.

## 2023-04-24 ENCOUNTER — OFFICE VISIT (OUTPATIENT)
Dept: ENT CLINIC | Age: 48
End: 2023-04-24
Payer: COMMERCIAL

## 2023-04-24 VITALS
HEART RATE: 77 BPM | WEIGHT: 315 LBS | TEMPERATURE: 98.4 F | OXYGEN SATURATION: 97 % | DIASTOLIC BLOOD PRESSURE: 80 MMHG | BODY MASS INDEX: 40.43 KG/M2 | SYSTOLIC BLOOD PRESSURE: 116 MMHG | HEIGHT: 74 IN

## 2023-04-24 DIAGNOSIS — K14.8 TONGUE LESION: Primary | Chronic | ICD-10-CM

## 2023-04-24 PROCEDURE — 31575 DIAGNOSTIC LARYNGOSCOPY: CPT | Performed by: OTOLARYNGOLOGY

## 2023-11-13 ENCOUNTER — OFFICE VISIT (OUTPATIENT)
Dept: ENT CLINIC | Age: 48
End: 2023-11-13
Payer: COMMERCIAL

## 2023-11-13 VITALS
TEMPERATURE: 97.6 F | BODY MASS INDEX: 40.43 KG/M2 | SYSTOLIC BLOOD PRESSURE: 121 MMHG | HEART RATE: 75 BPM | OXYGEN SATURATION: 96 % | HEIGHT: 74 IN | WEIGHT: 315 LBS | DIASTOLIC BLOOD PRESSURE: 81 MMHG

## 2023-11-13 DIAGNOSIS — R20.0 NUMBNESS OF TONGUE: Primary | Chronic | ICD-10-CM

## 2023-11-13 PROCEDURE — 99213 OFFICE O/P EST LOW 20 MIN: CPT | Performed by: OTOLARYNGOLOGY

## 2023-11-13 NOTE — PROGRESS NOTES
Chief Complaint   Patient presents with    Mouth Lesions     Sensation of numbness on the right posterior lateral tongue. HISTORY OF PRESENT ILLNESS     Jaun Bray is a 57 y.o. male here for recheck and follow up of a numbness sensation on the on the right posterior-lateral tongue. Patient stated that this has not changed since the last visit here. \"Same thing. Sometimes, I don't notice it and other times I notice it bennie from lack of sleep or allergies\" . REVIEW OF SYSTEMS  Constitutional:  Denied fever and chills. ENT/sinus:  Denied otalgia, otorrhea, nasal pain, rhinorrhea, sore throat, and sinus/facial pain. EXAMINATION    WDWN, NAD  Voice:  Normal.  Ears:  TMs, EACs, mastoids and pinnae were normal.    Nose:  Normal with no lesions. Sinuses:  NT x 4   Throat,  OC/OP:  No lesion seen in the area of concern, pointed out by the patient. Otherwise, normal with no lesions. INDIRECT LARYNGOSCOPY:  No lesion was seen in the area of concern. Visualization was good. Vocal cords appeared to be normal with no leukoplakia, mass, polyp, nodule or ulceration and appeared to be normally mobile bilaterally with midline approximation on phonation. Otherwise, the epiglottis, supraglottis, false vocal cords, true vocal cords, base of tongue, subglottis, and piriform sinuses appeared to be normal.    Neck:  NT, No masses. Trachea midline. Nodes:  No lymphadenopathy. Thyroid:  Normal with no goiter, nodules or tenderness. IMPRESSION / DIAGNOSES / ORDERS:   Deion Boyce was seen today for mouth lesions. Diagnoses and all orders for this visit:    Numbness of tongue  Comments:  one spot area of numbness on right posterior-lateral tongue, with no visible or palpable lesion. RECOMMENDATIONS / PLAN:   Direct laryngoscopy and biopsy, patient declined in favor of continue observation.   Return in about 6 months (around 5/13/2024) for recheck/follow-up, and sooner if condition

## 2024-06-13 ENCOUNTER — OFFICE VISIT (OUTPATIENT)
Dept: ENT CLINIC | Age: 49
End: 2024-06-13
Payer: COMMERCIAL

## 2024-06-13 VITALS
OXYGEN SATURATION: 95 % | WEIGHT: 315 LBS | HEIGHT: 74 IN | HEART RATE: 80 BPM | SYSTOLIC BLOOD PRESSURE: 126 MMHG | BODY MASS INDEX: 40.43 KG/M2 | DIASTOLIC BLOOD PRESSURE: 78 MMHG | TEMPERATURE: 97.9 F

## 2024-06-13 DIAGNOSIS — R20.0 NUMBNESS OF TONGUE: Primary | ICD-10-CM

## 2024-06-13 PROCEDURE — 99212 OFFICE O/P EST SF 10 MIN: CPT | Performed by: OTOLARYNGOLOGY

## 2024-06-13 NOTE — PROGRESS NOTES
CHIEF COMPLAINT  Chief Complaint   Patient presents with    Mouth Lesions     Sensation of numbness on the right posterior lateral tongue.       HISTORY OF PRESENT ILLNESS    Jaun Velázquez is a 49 y.o. male here for recheck and follow up of a Numb area on the right posterior-lateral side of the tongue.  Never felt anything back there.  Has been followed and rechecked with no lesion or mass appearing over time.      REVIEW OF SYSTEMS  Constitutional:  Denied fever and chills.  ENT/sinus:  Denied otalgia, otorrhea, nasal pain, rhinorrhea, sore throat, and sinus/facial pain.        EXAMINATION    WDWN, NAD  Voice:  Normal, with no hoarseness, breathiness, or hot potato quality.  (+)  Throat, OC/OP:  Normal with no lesions. Tongue area still with no visible lesion, looks the same, no palpable mass.   Neck:  NT, No masses.  Trachea midline.  Nodes:  No lymphadenopathy.     I performed this head and neck physical exam personally.        IMPRESSION / DIAGNOSES / ORDERS:   Jaun was seen today for mouth lesions.    Diagnoses and all orders for this visit:    Numbness of tongue         RECOMMENDATIONS / PLAN:   Return in about 6 months (around 12/13/2024) for recheck/follow-up, and sooner if condition worsens.         Rome Haddad MD    [facility]  Department of Otolaryngology/Head and Neck Surgery  [facility]  [address], [address]  [place], [place] [address] [phone]

## 2025-06-17 ENCOUNTER — OFFICE VISIT (OUTPATIENT)
Dept: ENT CLINIC | Age: 50
End: 2025-06-17
Payer: COMMERCIAL

## 2025-06-17 VITALS
HEIGHT: 74 IN | WEIGHT: 315 LBS | BODY MASS INDEX: 40.43 KG/M2 | DIASTOLIC BLOOD PRESSURE: 81 MMHG | HEART RATE: 70 BPM | OXYGEN SATURATION: 98 % | TEMPERATURE: 97.3 F | SYSTOLIC BLOOD PRESSURE: 132 MMHG

## 2025-06-17 DIAGNOSIS — R20.0 NUMBNESS OF TONGUE: Primary | ICD-10-CM

## 2025-06-17 DIAGNOSIS — J35.8 MUCOUS CYST OF TONSIL: ICD-10-CM

## 2025-06-17 PROCEDURE — 99213 OFFICE O/P EST LOW 20 MIN: CPT | Performed by: STUDENT IN AN ORGANIZED HEALTH CARE EDUCATION/TRAINING PROGRAM

## 2025-06-17 NOTE — PROGRESS NOTES
[facility]  DIVISION OF OTOLARYNGOLOGY- HEAD & NECK SURGERY  CLINIC FOLLOW-UP VISIT      Patient Name: Jaun Velázquez  Medical Record Number:  [id number]  Primary Care Physician:  None, None    ChiefComplaint     Chief Complaint   Patient presents with    Follow-up     F/u tongue lesions,        History of Present Illness     Jaun Velázquez is an 49 y.o. male previously seen for tongue numbness.  Last seen by Dr. Haddad on 2024.    Interval History:   He has been following with Dr. Haddad since  for a weird sensation on the back right of his tongue.  Feels like there is a \"popcorn kernel\" there.  He has had this spot biopsied by Dr. Haddad in the past which was negative.  He gets this sensation intermittently, seems to be worse during allergy flareups and after heavy drinking when he is hung over.  This patient spot is not painful.      Past Medical History     Past Medical History:   Diagnosis Date    Hyperlipidemia     Hypertension        Past Surgical History     Past Surgical History:   Procedure Laterality Date    MOUTH SURGERY N/A 2020    EXAM UNDER ANESTHESIA, DIRECT LARYNGOSCOPY,  BIOPSIES OF RIGHT  POSTERIOR LATERAL TONGUE AND RIGHT  BASE OF TONGUE performed [facility]       Family History     Family History   Problem Relation Age of Onset    High Blood Pressure Father     Cancer Father         lung       Social History     Social History     Tobacco Use    Smoking status: Former     Current packs/day: 0.00     Average packs/day: 2.0 packs/day for 20.0 years (40.0 ttl pk-yrs)     Types: Cigarettes     Start date: 1997     Quit date: 2017     Years since quittin.4    Smokeless tobacco: Never   Vaping Use    Vaping status: Never Used   Substance Use Topics    Alcohol use: Yes     Alcohol/week: 3.0 standard drinks of alcohol     Types: 3 Cans of beer per week     Comment: per month    Drug use: Never        Allergies     No Known Allergies    Medications

## (undated) DEVICE — PACK PROCEDURE SURG EXTREMITY MFFOP CUST

## (undated) DEVICE — 3 ML SYRINGE LUER-LOCK TIP: Brand: MONOJECT

## (undated) DEVICE — STERILE POLYISOPRENE POWDER-FREE SURGICAL GLOVES: Brand: PROTEXIS

## (undated) DEVICE — SOLUTION IV IRRIG 500ML 0.9% SODIUM CHL 2F7123

## (undated) DEVICE — TOWEL,OR,DSP,ST,BLUE,STD,4/PK,20PK/CS: Brand: MEDLINE

## (undated) DEVICE — HYPODERMIC SAFETY NEEDLE: Brand: MAGELLAN

## (undated) DEVICE — MEDICINE CUP, GRADUATED, STER: Brand: MEDLINE